# Patient Record
(demographics unavailable — no encounter records)

---

## 2019-01-08 NOTE — DIAGNOSTIC IMAGING REPORT
#[id number] - [id number]

#BILATERAL DIGITAL SCREENING MAMMOGRAM WITH CAD: 12/31/2018

CLINICAL: Routine screening.  



Comparison is made to exam dated:  12/31/2013 mammogram - [facility] [place].  Current study 

contains 8 films.  

There are scattered fibroglandular elements in both breasts.  

Current study was also evaluated with a Computer Aided Detection (CAD) system.  

There is an irregular asymmetry in the right breast at 1 o'clock middle depth.  This is best noted 

on the implant displacement views.  The implants appear intact.  Scattered benign appearing 

calcifications are present bilaterally.   

No other significant masses, calcifications, or other findings are seen in either breast.  



IMPRESSION: INCOMPLETE: NEEDS ADDITIONAL IMAGING EVALUATION

The irregular asymmetry in the right breast is indeterminate.  Additional views with possible 

ultrasound are recommended.  



The patient will be contacted by the Mammography Department to schedule this appointment.  





Kevin Tariq Jr., D.O.          

cw/:1/7/2019 14:00:01  



Imaging Technologist: Astrid KAT)(CHRISTIAN), [facility]

letter sent: Additional Imaging Needed  

Mammogram BI-RADS: 0 Indeterminate